# Patient Record
Sex: MALE | HISPANIC OR LATINO | Employment: FULL TIME | URBAN - METROPOLITAN AREA
[De-identification: names, ages, dates, MRNs, and addresses within clinical notes are randomized per-mention and may not be internally consistent; named-entity substitution may affect disease eponyms.]

---

## 2022-07-03 ENCOUNTER — OFFICE VISIT (OUTPATIENT)
Dept: URGENT CARE | Facility: CLINIC | Age: 22
End: 2022-07-03
Payer: COMMERCIAL

## 2022-07-03 ENCOUNTER — APPOINTMENT (OUTPATIENT)
Dept: RADIOLOGY | Facility: IMAGING CENTER | Age: 22
End: 2022-07-03
Payer: COMMERCIAL

## 2022-07-03 VITALS
OXYGEN SATURATION: 98 % | SYSTOLIC BLOOD PRESSURE: 118 MMHG | BODY MASS INDEX: 21.47 KG/M2 | TEMPERATURE: 100.9 F | RESPIRATION RATE: 18 BRPM | HEIGHT: 70 IN | HEART RATE: 123 BPM | WEIGHT: 150 LBS | DIASTOLIC BLOOD PRESSURE: 64 MMHG

## 2022-07-03 DIAGNOSIS — B34.9 VIRAL ILLNESS: ICD-10-CM

## 2022-07-03 DIAGNOSIS — M79.10 MYALGIA: ICD-10-CM

## 2022-07-03 DIAGNOSIS — S46.912A SHOULDER STRAIN, LEFT, INITIAL ENCOUNTER: ICD-10-CM

## 2022-07-03 DIAGNOSIS — M25.512 ACUTE PAIN OF LEFT SHOULDER: ICD-10-CM

## 2022-07-03 LAB
EXTERNAL QUALITY CONTROL: NORMAL
FLUAV+FLUBV AG SPEC QL IA: NEGATIVE
INT CON NEG: NORMAL
INT CON POS: NORMAL
SARS-COV+SARS-COV-2 AG RESP QL IA.RAPID: NEGATIVE

## 2022-07-03 PROCEDURE — 99204 OFFICE O/P NEW MOD 45 MIN: CPT

## 2022-07-03 PROCEDURE — 73030 X-RAY EXAM OF SHOULDER: CPT | Mod: TC,FY,LT | Performed by: RADIOLOGY

## 2022-07-03 PROCEDURE — 87804 INFLUENZA ASSAY W/OPTIC: CPT

## 2022-07-03 PROCEDURE — 87426 SARSCOV CORONAVIRUS AG IA: CPT

## 2022-07-03 RX ORDER — KETOROLAC TROMETHAMINE 30 MG/ML
30 INJECTION, SOLUTION INTRAMUSCULAR; INTRAVENOUS ONCE
Status: COMPLETED | OUTPATIENT
Start: 2022-07-03 | End: 2022-07-03

## 2022-07-03 RX ADMIN — KETOROLAC TROMETHAMINE 30 MG: 30 INJECTION, SOLUTION INTRAMUSCULAR; INTRAVENOUS at 13:54

## 2022-07-03 ASSESSMENT — ENCOUNTER SYMPTOMS
DIARRHEA: 0
DIZZINESS: 1
MYALGIAS: 1
FEVER: 1
CHILLS: 1
VOMITING: 0
COUGH: 0
SORE THROAT: 0
NAUSEA: 1
MUSCULOSKELETAL PAIN: 1
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
## 2022-07-21 ENCOUNTER — TELEPHONE (OUTPATIENT)
Dept: SCHEDULING | Facility: IMAGING CENTER | Age: 22
End: 2022-07-21

## 2022-07-22 ENCOUNTER — OFFICE VISIT (OUTPATIENT)
Dept: MEDICAL GROUP | Facility: IMAGING CENTER | Age: 22
End: 2022-07-22
Payer: COMMERCIAL

## 2022-07-22 ENCOUNTER — HOSPITAL ENCOUNTER (OUTPATIENT)
Facility: MEDICAL CENTER | Age: 22
End: 2022-07-22
Attending: CLINICAL NURSE SPECIALIST
Payer: COMMERCIAL

## 2022-07-22 VITALS
TEMPERATURE: 98.5 F | DIASTOLIC BLOOD PRESSURE: 70 MMHG | WEIGHT: 123.8 LBS | HEART RATE: 92 BPM | OXYGEN SATURATION: 95 % | SYSTOLIC BLOOD PRESSURE: 116 MMHG | BODY MASS INDEX: 17.72 KG/M2 | HEIGHT: 70 IN

## 2022-07-22 DIAGNOSIS — R11.10 VOMITING, INTRACTABILITY OF VOMITING NOT SPECIFIED, PRESENCE OF NAUSEA NOT SPECIFIED, UNSPECIFIED VOMITING TYPE: ICD-10-CM

## 2022-07-22 DIAGNOSIS — R68.83 CHILLS: ICD-10-CM

## 2022-07-22 DIAGNOSIS — M25.512 ACUTE PAIN OF LEFT SHOULDER: ICD-10-CM

## 2022-07-22 DIAGNOSIS — Z76.89 ENCOUNTER TO ESTABLISH CARE WITH NEW DOCTOR: ICD-10-CM

## 2022-07-22 DIAGNOSIS — Z13.31 DEPRESSION SCREENING: ICD-10-CM

## 2022-07-22 LAB
EXTERNAL QUALITY CONTROL: NORMAL
SARS-COV+SARS-COV-2 AG RESP QL IA.RAPID: NEGATIVE

## 2022-07-22 PROCEDURE — 87426 SARSCOV CORONAVIRUS AG IA: CPT | Performed by: CLINICAL NURSE SPECIALIST

## 2022-07-22 PROCEDURE — U0003 INFECTIOUS AGENT DETECTION BY NUCLEIC ACID (DNA OR RNA); SEVERE ACUTE RESPIRATORY SYNDROME CORONAVIRUS 2 (SARS-COV-2) (CORONAVIRUS DISEASE [COVID-19]), AMPLIFIED PROBE TECHNIQUE, MAKING USE OF HIGH THROUGHPUT TECHNOLOGIES AS DESCRIBED BY CMS-2020-01-R: HCPCS

## 2022-07-22 PROCEDURE — 99204 OFFICE O/P NEW MOD 45 MIN: CPT | Mod: CS | Performed by: CLINICAL NURSE SPECIALIST

## 2022-07-22 PROCEDURE — U0005 INFEC AGEN DETEC AMPLI PROBE: HCPCS

## 2022-07-22 RX ORDER — OMEPRAZOLE 20 MG/1
20 CAPSULE, DELAYED RELEASE ORAL DAILY
Qty: 30 CAPSULE | Refills: 0 | Status: SHIPPED | OUTPATIENT
Start: 2022-07-22

## 2022-07-22 RX ORDER — ONDANSETRON 4 MG/1
4 TABLET, FILM COATED ORAL EVERY 4 HOURS PRN
Qty: 5 TABLET | Refills: 0 | Status: SHIPPED | OUTPATIENT
Start: 2022-07-22

## 2022-07-22 ASSESSMENT — PAIN SCALES - GENERAL: PAINLEVEL: 2=MINIMAL-SLIGHT

## 2022-07-22 ASSESSMENT — PATIENT HEALTH QUESTIONNAIRE - PHQ9: CLINICAL INTERPRETATION OF PHQ2 SCORE: 0

## 2022-07-22 NOTE — ASSESSMENT & PLAN NOTE
Back pain, myalgia, headache started when he was in [place] at the end of June.  The following day left shoulder pain developed with immobility.  He took Excedrin.  He can lift in now, but with abduction and inward rotation he feels pain. Pain is minor now but is exacerbated with certain movements.

## 2022-07-22 NOTE — ASSESSMENT & PLAN NOTE
Vomiting started 7/3 for 2-3 days then nothing until this week.  Went to UC initially with a negative workup.  In recent occurrence, he belches before he vomits and has been vomiting daily for 6 days. Vomiting is always after eating without nausea.  He has had chills intermittently throughout the week with arthralgia but this has improved.  Mild cough recently.  No diarrhea, headaches, shortness of breath, abdominal pain, hematemesis, congestion.   No change in BM's.  No epigastric pain, fullness.  They had COVID 19 previously approximately one year ago. He has had COVID-19 twice.

## 2022-07-22 NOTE — PROGRESS NOTES
Subjective     Jim Salguero is a 22 y.o. male who presents with Shoulder Pain (Left shoulder pain, started a month.) and Emesis (X1week, unable to keep food down. )            HPI  Vomiting  Vomiting started 7/3 for 2-3 days then nothing until this week.  Went to  initially with a negative workup.  In recent occurrence, he belches before he vomits and has been vomiting daily for 6 days. Vomiting is always after eating without nausea.  He has had chills intermittently throughout the week with arthralgia but this has improved.  Mild cough recently.  No diarrhea, headaches, shortness of breath, abdominal pain, hematemesis, congestion.   No change in BM's.  No epigastric pain, fullness.  They had COVID 19 previously approximately one year ago. He has had COVID-19 twice.      Acute pain of left shoulder  Back pain, myalgia, headache started when he was in [place] at the end of June.  The following day left shoulder pain developed with immobility.  He took Excedrin.  He can lift in now, but with abduction and inward rotation he feels pain. Pain is minor now but is exacerbated with certain movements.       ROS  See HPI      No Known Allergies    No current outpatient medications on file prior to visit.     No current facility-administered medications on file prior to visit.     Depression Screening    Little interest or pleasure in doing things?  0 - not at all  Feeling down, depressed , or hopeless? 0 - not at all  Patient Health Questionnaire Score: 0    If depressive symptoms identified deferred to follow up visit unless specifically addressed in assesment and plan.      Interpretation of PHQ-9 Total Score   Score Severity   1-4 Minimal Depression   5-9 Mild Depression   10-14 Moderate Depression   15-19 Moderately Severe Depression   20-27 Severe Depression          Objective     /70 (BP Location: Left arm, Patient Position: Sitting, BP Cuff Size: Adult)   Pulse 92   Temp 36.9 °C (98.5 °F)  "(Temporal)   Ht 1.778 m (5' 10\")   Wt 56.2 kg (123 lb 12.8 oz)   SpO2 95%   BMI 17.76 kg/m²      Physical Exam  Constitutional:       General: He is not in acute distress.     Appearance: He is not ill-appearing, toxic-appearing or diaphoretic.   HENT:      Head: Normocephalic and atraumatic.   Eyes:      General: No scleral icterus.     Pupils: Pupils are equal, round, and reactive to light.   Cardiovascular:      Rate and Rhythm: Normal rate and regular rhythm.      Heart sounds: Normal heart sounds.   Pulmonary:      Effort: Pulmonary effort is normal.      Breath sounds: Normal breath sounds.   Abdominal:      General: Bowel sounds are normal.      Palpations: Abdomen is soft.      Tenderness: There is guarding (ticklish). There is no rebound.      Comments: Vomited while in clinic   Skin:     General: Skin is warm and dry.   Neurological:      Mental Status: He is alert and oriented to person, place, and time.   Psychiatric:         Mood and Affect: Mood normal.         Behavior: Behavior normal.         Thought Content: Thought content normal.         Judgment: Judgment normal.                xray 7/3/2022  IMPRESSION:     Negative left shoulder series           Assessment & Plan        1. Encounter to establish care with new doctor  Establishing care today. Will defer vaccination until vomiting under control    2. Depression screening  Score 0    3. Vomiting, intractability of vomiting not specified, presence of nausea not specified, unspecified vomiting type  Unremarkable abdominal exam.  POCT COVID-19 test negative.  Recent excessive Advil use.  Differentials include acid reflux with possible esophageal edema, infectious process, ulceration, obstruction.       Lengthy discussion about symptoms, differentials and plan.  Will start with labs, PPI and as needed ondansetron.  Drink calories since he has tolerated liquid better than solid.  Complete labs. Referral to GI placed.    Depending on the results of " the above interventions, will consider abdominal CT.     - POCT SARS-COV Antigen SHARRI (Symptomatic only)-NEGATIVE  -COVID 19 PCR  - CBC WITH DIFFERENTIAL; Future  - Comp Metabolic Panel; Future  - Referral to Gastroenterology  - OCCULT BLOOD STOOL; Future  - H.PYLORI STOOL ANTIGEN; Future  - omeprazole (PRILOSEC) 20 MG delayed-release capsule; Take 1 Capsule by mouth every day.  Dispense: 30 Capsule; Refill: 0  - ondansetron (ZOFRAN) 4 MG Tab tablet; Take 1 Tablet by mouth every four hours as needed for Nausea/Vomiting.  Dispense: 5 Tablet; Refill: 0    4. Acute pain of left shoulder  Presentation is consistent with frozen shoulder that is healing.  He was informed this will likely take time to fully hear.  He would like a referral to PT and orthopedics for further evaluation. Referral placed.    - Referral to Physical Therapy  - Referral to Orthopedics    5. Chills  Recent but resolved. Vomited in clinic.  - POCT SARS-COV Antigen SHARRI (Symptomatic only)  -COVID PCR    I believe Jim can be managed outpatient at this time.       Return if symptoms worsen or fail to improve, for With test results.

## 2022-07-23 LAB
COVID ORDER STATUS COVID19: NORMAL
SARS-COV-2 RNA RESP QL NAA+PROBE: NOTDETECTED
SPECIMEN SOURCE: NORMAL

## 2022-07-25 ENCOUNTER — HOSPITAL ENCOUNTER (OUTPATIENT)
Dept: LAB | Facility: MEDICAL CENTER | Age: 22
End: 2022-07-25
Attending: CLINICAL NURSE SPECIALIST
Payer: COMMERCIAL

## 2022-07-25 DIAGNOSIS — R11.10 VOMITING, INTRACTABILITY OF VOMITING NOT SPECIFIED, PRESENCE OF NAUSEA NOT SPECIFIED, UNSPECIFIED VOMITING TYPE: ICD-10-CM

## 2022-07-25 LAB
BASOPHILS # BLD AUTO: 0.5 % (ref 0–1.8)
BASOPHILS # BLD: 0.03 K/UL (ref 0–0.12)
EOSINOPHIL # BLD AUTO: 0.24 K/UL (ref 0–0.51)
EOSINOPHIL NFR BLD: 4.4 % (ref 0–6.9)
ERYTHROCYTE [DISTWIDTH] IN BLOOD BY AUTOMATED COUNT: 35.7 FL (ref 35.9–50)
HCT VFR BLD AUTO: 42.9 % (ref 42–52)
HGB BLD-MCNC: 15 G/DL (ref 14–18)
IMM GRANULOCYTES # BLD AUTO: 0 K/UL (ref 0–0.11)
IMM GRANULOCYTES NFR BLD AUTO: 0 % (ref 0–0.9)
LYMPHOCYTES # BLD AUTO: 1.26 K/UL (ref 1–4.8)
LYMPHOCYTES NFR BLD: 23 % (ref 22–41)
MCH RBC QN AUTO: 28.8 PG (ref 27–33)
MCHC RBC AUTO-ENTMCNC: 35 G/DL (ref 33.7–35.3)
MCV RBC AUTO: 82.3 FL (ref 81.4–97.8)
MONOCYTES # BLD AUTO: 0.25 K/UL (ref 0–0.85)
MONOCYTES NFR BLD AUTO: 4.6 % (ref 0–13.4)
NEUTROPHILS # BLD AUTO: 3.69 K/UL (ref 1.82–7.42)
NEUTROPHILS NFR BLD: 67.5 % (ref 44–72)
NRBC # BLD AUTO: 0 K/UL
NRBC BLD-RTO: 0 /100 WBC
PLATELET # BLD AUTO: 300 K/UL (ref 164–446)
PMV BLD AUTO: 10.4 FL (ref 9–12.9)
RBC # BLD AUTO: 5.21 M/UL (ref 4.7–6.1)
WBC # BLD AUTO: 5.5 K/UL (ref 4.8–10.8)

## 2022-07-25 PROCEDURE — 85025 COMPLETE CBC W/AUTO DIFF WBC: CPT

## 2022-07-25 PROCEDURE — 80053 COMPREHEN METABOLIC PANEL: CPT

## 2022-07-25 PROCEDURE — 36415 COLL VENOUS BLD VENIPUNCTURE: CPT

## 2022-07-26 LAB
ALBUMIN SERPL BCP-MCNC: 4.3 G/DL (ref 3.2–4.9)
ALBUMIN/GLOB SERPL: 1.2 G/DL
ALP SERPL-CCNC: 78 U/L (ref 30–99)
ALT SERPL-CCNC: 16 U/L (ref 2–50)
ANION GAP SERPL CALC-SCNC: 11 MMOL/L (ref 7–16)
AST SERPL-CCNC: 15 U/L (ref 12–45)
BILIRUB SERPL-MCNC: 0.7 MG/DL (ref 0.1–1.5)
BUN SERPL-MCNC: 6 MG/DL (ref 8–22)
CALCIUM SERPL-MCNC: 9.8 MG/DL (ref 8.5–10.5)
CHLORIDE SERPL-SCNC: 102 MMOL/L (ref 96–112)
CO2 SERPL-SCNC: 25 MMOL/L (ref 20–33)
CREAT SERPL-MCNC: 0.86 MG/DL (ref 0.5–1.4)
GFR SERPLBLD CREATININE-BSD FMLA CKD-EPI: 124 ML/MIN/1.73 M 2
GLOBULIN SER CALC-MCNC: 3.5 G/DL (ref 1.9–3.5)
GLUCOSE SERPL-MCNC: 75 MG/DL (ref 65–99)
POTASSIUM SERPL-SCNC: 4.3 MMOL/L (ref 3.6–5.5)
PROT SERPL-MCNC: 7.8 G/DL (ref 6–8.2)
SODIUM SERPL-SCNC: 138 MMOL/L (ref 135–145)

## 2022-07-28 ENCOUNTER — TELEPHONE (OUTPATIENT)
Dept: MEDICAL GROUP | Facility: IMAGING CENTER | Age: 22
End: 2022-07-28
Payer: COMMERCIAL